# Patient Record
Sex: MALE | Race: WHITE | URBAN - METROPOLITAN AREA
[De-identification: names, ages, dates, MRNs, and addresses within clinical notes are randomized per-mention and may not be internally consistent; named-entity substitution may affect disease eponyms.]

---

## 2021-09-29 ENCOUNTER — OFFICE VISIT (OUTPATIENT)
Dept: PULMONOLOGY | Age: 51
End: 2021-09-29
Payer: COMMERCIAL

## 2021-09-29 VITALS
DIASTOLIC BLOOD PRESSURE: 76 MMHG | SYSTOLIC BLOOD PRESSURE: 126 MMHG | WEIGHT: 284.2 LBS | RESPIRATION RATE: 18 BRPM | BODY MASS INDEX: 39.79 KG/M2 | HEIGHT: 71 IN | TEMPERATURE: 97.6 F | OXYGEN SATURATION: 96 % | HEART RATE: 68 BPM

## 2021-09-29 DIAGNOSIS — G47.33 OSA (OBSTRUCTIVE SLEEP APNEA): ICD-10-CM

## 2021-09-29 DIAGNOSIS — J30.89 NON-SEASONAL ALLERGIC RHINITIS DUE TO OTHER ALLERGIC TRIGGER: Primary | ICD-10-CM

## 2021-09-29 DIAGNOSIS — R06.83 SNORING: ICD-10-CM

## 2021-09-29 PROCEDURE — 99204 OFFICE O/P NEW MOD 45 MIN: CPT | Performed by: INTERNAL MEDICINE

## 2021-09-29 RX ORDER — METOPROLOL TARTRATE 50 MG/1
50 TABLET ORAL 2 TIMES DAILY
COMMUNITY
Start: 2021-08-04

## 2021-09-29 RX ORDER — IPRATROPIUM BROMIDE 42 UG/1
2 SPRAY, METERED NASAL 3 TIMES DAILY
Qty: 15 ML | Refills: 1 | Status: SHIPPED | OUTPATIENT
Start: 2021-09-29

## 2021-09-29 RX ORDER — ROSUVASTATIN CALCIUM 10 MG/1
TABLET, COATED ORAL
COMMUNITY
Start: 2021-08-04

## 2021-09-29 NOTE — LETTER
9/29/2021    Patient: Michelle Jacques   YOB: 1970   Date of Visit: 9/29/2021     Adriana Woods MD  [address] [place] [id number]  Via Fax: [phone]    Dear Adriana Woods MD,      Thank you for referring Mr. Michelle Jacques to [address] [place] [address] for evaluation. My notes for this consultation are attached. If you have questions, please do not hesitate to call me. I look forward to following your patient along with you.       Sincerely,    La Brock MD

## 2021-09-29 NOTE — PROGRESS NOTES
Christian Ng presents today for   Chief Complaint   Patient presents with    Sleep Problem     Snoring       Is someone accompanying this pt? No    Is the patient using any DME equipment during OV? No    -DME Company NA    Depression Screening:  3 most recent PHQ Screens 9/29/2021   Little interest or pleasure in doing things Not at all   Feeling down, depressed, irritable, or hopeless Not at all   Total Score PHQ 2 0       Learning Assessment:  Learning Assessment 9/29/2021   PRIMARY LEARNER Patient   PRIMARY LANGUAGE ENGLISH   LEARNER PREFERENCE PRIMARY READING     DEMONSTRATION   RELATIONSHIP SELF       Abuse Screening:  No flowsheet data found. Fall Risk  No flowsheet data found. Coordination of Care:  1. Have you been to the ER, urgent care clinic since your last visit? Hospitalized since your last visit? No    2. Have you seen or consulted any other health care providers outside of the [address] [place] [address] since your last visit? Include any pap smears or colon screening.  No

## 2021-09-29 NOTE — PROGRESS NOTES
[facility]  Pulmonary, Critical Care, and Sleep Medicine      Initial pulmonary Office clinic visit    Name: Alba Saavedra     : 1970     Date: 2021        Subjective:   Patient has been referred for evaluation of: Cough and snoring    Patient is a 46 y.o. male is seeking evaluation for persistent symptoms of cough which lasted about 3 months and now have improved. He had a dry to occasionally productive cough for several months which now has improved. He has been diagnosed with allergic rhinitis and was prescribed Flonase along with another nasal spray but had to stop using it due to significant burning in his nostrils. He describes difficulty breathing through his nose with almost complete blockage of the right nostril. Denies any significant postnasal drainage that he is aware of. States that his whole right side of the face is always sore and uncomfortable  Does complain of occasional wheezing  Complains of waking up at night not being able to breathe. Has daytime fatigue  Denies persistent headaches  No complaints of chest pain, fever, chills, night sweats dyspepsia, reflux. Patient is a never smoker  Occupational exposure-retired . 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 History reviewed. No pertinent past medical history. History reviewed. No pertinent surgical history.     Social History     Socioeconomic History    Marital status: SINGLE     Spouse name: Not on file    Number of children: Not on file    Years of education: Not on file    Highest education level: Not on file   Tobacco Use    Smoking status: Never Smoker    Smokeless tobacco: Never Used     Social Determinants of Health     Financial Resource Strain:     Difficulty of Paying Living Expenses:    Food Insecurity:     Worried About Running Out of Food in the Last Year:     [address] in the Last Year:    Transportation Needs:     Lack of Transportation (Medical):  Lack of Transportation (Non-Medical):    Physical Activity:     Days of Exercise per Week:     Minutes of Exercise per Session:    Stress:     Feeling of Stress :    Social Connections:     Frequency of Communication with Friends and Family:     Frequency of Social Gatherings with Friends and Family:     Attends Jehov[employer] witness Services:     Active Member of Clubs or Organizations:     Attends Club or Organization Meetings:     Marital Status:        Family History   Problem Relation Age of Onset    Asthma Mother     Hypertension Mother     Hypertension Father        No Known Allergies    Current Outpatient Medications   Medication Sig Dispense Refill    metoprolol tartrate (LOPRESSOR) 50 mg tablet Take 50 mg by mouth two (2) times a day.  rosuvastatin (CRESTOR) 10 mg tablet TAKE 1 TABLET BY MOUTH ONCE DAILY AT BEDTIME      ipratropium (ATROVENT) 42 mcg (0.06 %) nasal spray 2 Sprays by Right Nostril route three (3) times daily.  15 mL 1     Review of Systems:  HEENT: No epistaxis, difficulty breathing through right nostril -blocked ,no nasal drainage, no difficulty in swallowing, no redness in eyes  Respiratory: as above  Cardiovascular: no chest pain, no palpitations, no chronic leg edema, no syncope  Gastrointestinal: no abd pain, no vomiting, no diarrhea, no bleeding symptoms  Genitourinary: No urinary symptoms or hematuria  Integument/breast: No ulcers or rashes  Musculoskeletal:Neg  Neurological: No focal weakness, no seizures, no headaches  Behvioral/Psych: No anxiety, no depression  Constitutional: No fever, no chills, no weight loss, no night sweats     Objective:     Visit Vitals  /76 (BP 1 Location: Left upper arm, BP Patient Position: Sitting, BP Cuff Size: Large adult)   Pulse 68   Temp 97.6 °F (36.4 °C) (Oral)   Resp 18   Ht 5' 11\" (1.803 m)   Wt 128.9 kg (284 lb 3.2 oz)   SpO2 96% Comment: RA Rest   BMI 39.64 kg/m²        Physical Exam:   General: comfortable, no acute distress  HEENT: pupils reactive, sclera anicteric, markedly hypertrophied nasal turbinates with complete occlusion of right nasal lumen. Tonsillar hypertrophy, Mallampati 3-4  Neck: No adenopathy or thyroid swelling, no lymphadenopathy or JVD, supple  CVS: S1S2 no murmurs  RS: Mod AE bilaterally, no tactile fremitus or egophony, no accessory muscle use  Abd: soft, non tender, no hepatosplenomegaly  Neuro: non focal, awake, alert  Extrm: no leg edema, clubbing or cyanosis  Skin: no rash    Data review:   Pertinent labs: CBC, BMP,  No results found for this or any previous visit. Imaging:  I have personally reviewed the patients radiographs and have reviewed the reports:  XR Results (most recent):  No results found for this or any previous visit. CT Results (most recent):  No results found for this or any previous visit. .   There is no problem list on file for this patient. IMPRESSION:   · Sleep apnea-significant history of snoring with nocturnal awakenings and daytime fatigue symptoms. Body habitus and nasal obstruction as well as Mallampati 3-4 additional contributors with tonsillar hypertrophy. · Persistent cough-improved with treatment. Likely upper airway cough syndrome with findings of chronic rhinitis, significant hypertrophic turbinates and right nostril obstruction. Nonseasonal allergic and nonallergic triggers likely contributor  · Obesity-BMI 39.64, recent weight loss of 15 pounds with diet changes  · History of hypertension  · Hyperlipidemia      RECOMMENDATIONS:   · Discussed with patient need for further evaluation and treatment interventions  · Will check IgE, [facility] allergy panel and consider adding Singulair  · Adding Atrovent nasal spray.  Previous intolerance to Flonase  · We will order home sleep study and consider titration study if indicated  · Will consider referral to ENT if nasal symptoms persist  · Healthy weight-patient is working on weight loss  · Preventive vaccinations  · Discussed with patient care plan and follow-up after testing completed     Health maintenance screens deferred to Primary care provider. Elodia Cage MD    This patient has a high complexity chronic care condition   This Visit needed Moderate-High complexity medically necessary decision making and management plans. Please note that this dictation was completed with [employer], the computer voice recognition software. Quite often unanticipated grammatical, syntax, homophones, and other interpretive errors are inadvertently transcribed by the computer software. Please disregard these errors. Please excuse any errors that have escaped final proofreading.

## 2021-10-26 ENCOUNTER — HOSPITAL ENCOUNTER (OUTPATIENT)
Dept: SLEEP MEDICINE | Age: 51
Discharge: HOME | End: 2021-10-26
Payer: COMMERCIAL

## 2021-10-26 DIAGNOSIS — R06.83 SNORING: ICD-10-CM

## 2021-10-26 DIAGNOSIS — G47.33 OSA (OBSTRUCTIVE SLEEP APNEA): ICD-10-CM

## 2021-10-26 PROCEDURE — 95806 SLEEP STUDY UNATT&RESP EFFT: CPT

## 2021-10-27 ENCOUNTER — HOSPITAL ENCOUNTER (OUTPATIENT)
Dept: SLEEP MEDICINE | Age: 51
Discharge: HOME | End: 2021-10-27
Payer: COMMERCIAL

## 2021-10-28 DIAGNOSIS — G47.31 COMPLEX SLEEP APNEA SYNDROME: Primary | ICD-10-CM

## 2021-10-28 DIAGNOSIS — G47.34 NOCTURNAL HYPOXIA: ICD-10-CM

## 2021-11-19 ENCOUNTER — HOSPITAL ENCOUNTER (OUTPATIENT)
Dept: SLEEP MEDICINE | Age: 51
Discharge: HOME | End: 2021-11-19
Payer: COMMERCIAL

## 2021-11-19 DIAGNOSIS — G47.31 COMPLEX SLEEP APNEA SYNDROME: ICD-10-CM

## 2021-11-19 DIAGNOSIS — G47.34 NOCTURNAL HYPOXIA: ICD-10-CM

## 2021-11-19 PROCEDURE — 95811 POLYSOM 6/>YRS CPAP 4/> PARM: CPT

## 2021-11-20 VITALS
DIASTOLIC BLOOD PRESSURE: 90 MMHG | BODY MASS INDEX: 40.88 KG/M2 | HEIGHT: 71 IN | SYSTOLIC BLOOD PRESSURE: 127 MMHG | WEIGHT: 292 LBS

## 2021-11-29 DIAGNOSIS — G47.33 OSA (OBSTRUCTIVE SLEEP APNEA): Primary | ICD-10-CM

## 2021-11-29 NOTE — PROGRESS NOTES
[address], [place], [address],  [phone]     Polysomnography Report    Poppy Tillman       Patient: Jose Patel Study Type: Titration PSG   : 1970 Patient Details: Male, 51 years, Height 5' 11\",   MR#: MV   Weight 292 lbs, BMI 40.72   Physician: Chrissy Chavez DO Ref. Physician: Tomasa Sainz MD   Recording Technician: ANNALISE Rojas, [facility], [place] Recording Details: Recorded at 8:41:03 PM on 2021    Scoring Technician:   for 590 minutes. STUDY PROTOCOL: The study consisted of 14 channels, including left and right EOG and EEG, submental and extremity EMG, EKG, airflow, respiratory effort and oximetry measurement. The study report was generated by personal review of the raw data from the patient's sleep study. TECHNICAL: A Hypopnea was scored according to AASM guidelines. A hypopnea was scored when the nasal pressure signal dropped by 30% or greater from the pre-event baseline for greater than 9 seconds and was accompanied by at least a 3% or 4% drop in the SpO2 from pre-event baseline. For Medicare and/or Medicaid patients, hypopneas were scored with an associated 4% drop in SpO2 from pre-event baseline. PROCEDURE: PAP Titration     MEDICATIONS: Lopressor, Crestor, Atrovent    SUMMARY: The blood pressure readings: pre sleep /83 post sleep /90. The sleep efficiency was 92%. Total Sleep Time: 6.8 hours. The patient spent 0.0 minutes awake after sleep onset. During the night, 13 awakenings were recorded. The arousal index was 10.3 per hour. The sleep onset latency was 6.5 minutes; the stage 2 latency was 9.5 minutes. REM sleep was 47 percent of sleep time; slow wave sleep was 27 percent. Snoring was noted initially to be intermittent and loud. Snoring improved but did not fully resolve. The overall AHI was 7.3 per hour. The RDI was 11.4 per hour.  The PLM index was 42.5, with 0 associated with arousal hourly, on average. The time with oxygen saturation below 88% was 14 minutes. The minimum oxygen saturation was 69%. The oxygen desaturation index was 7.0. The average respiratory event lasted 29.4 seconds. The longest event was 99.0 seconds. The respiratory rate was typically between 12 and 20 breaths per minute; Kush Hernandez pattern was not seen. Mouth breathing was frequently noted by recording tech. No hypnotic was reportedly taken. The patient reported insufficient sleep the previous night (<7 hours). Sleep hygiene violation:  caffeine. Sleep was interrupted by one bathroom trip. Cardiac tracing appeared to be normal sinus rhythm, without significant atrial fibrillation, heart block, bradycardia or tachycardia. Diastolic blood pressure was elevated. No alpha intrusion, parasomnias or EEG abnormalities occurred. Supplemental oxygen was not used during this study. Summary:   The patient underwent a CPAP titration study. Overall, the patient appeared to tolerate study well and reported sleeping better than typical sleep pattern. The patient did note some pressure intolerance at higher CPAP settings and their were some leak issues earlier on in this study. The patient used a large Simplus full-face mask. The patient slept propped up with 2-3 pillows under their head. Some central apneas appeared at around a CPAP setting of 16-17 cwp but then resolved. RACIEL physiology appeared well optimized between a CPAP setting of 17 to 18 cwp. Central events appeared resolved at these setting and REM rebound was also noted. Minimal snoring was noted by the end of this study. The periodic limb movement index was elevated. In some instances, this can suggest an underlying periodic limb movement disorder or other pain process such as radiculopathy. Further clinical correlated of this later finding is recommended.     <BR>PRESSURES USED DURING THE STUDY: 5, 7, 9, 11, 12, 13, 14, 15, 16, 17, 18, 19, 18/14, 20/16, 22/16 cwp. DIAGNOSIS: 1) Obstructive Sleep Apnea    RECOMMENDATIONS:     This report was generated by personal review of the raw data which was acceptable for interpretation     Start patient on auto CPAP (APAP) at 12/18 cwp; EPR/Flex:2.   Monitor for emergence of central apneas   Encourage the patient to avoid sleeping in a flat, supine position.  Clinical correlation for elevated PLM index.  Other non-invasive treatment options are recommended were applicable and include the following: weight reduction, smoking cessation, body position training, and modification of alcohol ingestion and/or sedating agents.  If these treatments are not possible or effective, an oral appliance may be an alternative and/or adjuvant non-invasive treatment.  If non-invasive treatments are not possible or effective, consideration may be given to possible corrective surgical options.  Healthy sleep habits are encouraged.  Individuals are encouraged to obtain 7-9 hours of sleep per day.   safety is encouraged. Drowsy and/or inattentive driving should be avoided.  COVID-19 precautions as recommended by the Centers for Disease Control (CDC)     Follow up with provider as directed.     Marlin Aguilera DO Electronically signed at 3:04:31 PM, November 29, 2021

## 2021-12-03 ENCOUNTER — TELEPHONE (OUTPATIENT)
Dept: PULMONOLOGY | Age: 51
End: 2021-12-03

## 2021-12-03 NOTE — TELEPHONE ENCOUNTER
Pt's mother SALENA([phone]). Pt has not gotten the results of sleep study done 11/19/21. Please have Dr Raj Timmons or nurse call him with the results as soon as possible.

## 2021-12-06 ENCOUNTER — TELEPHONE (OUTPATIENT)
Dept: PULMONOLOGY | Age: 51
End: 2021-12-06

## 2021-12-06 NOTE — TELEPHONE ENCOUNTER
Pt's mother MAGGIE([phone]). Pt needs to talk to nurse or Dr Yovanny Alexander regarding the results of his sleep study. It is important that he gets a call back as soon as possible. Pt has already called with no response.

## 2021-12-08 NOTE — TELEPHONE ENCOUNTER
APAP 12/18 order faxed to [address] [place] [address], [phone]. Patient contacted and study results reviewed with him. DME info provided to patient and he is encouraged to contact our office with any additional questions or concerns he may have. Patient should hear from [address] [place] [address]  within 2 weeks. If not, I have requested they call the office to let me know.

## 2021-12-13 NOTE — TELEPHONE ENCOUNTER
APAP 12/18 order faxed to [address] [place] [address], [phone]. Patient contacted and study results reviewed with him. DME info provided to patient and he is encouraged to contact our office with any additional questions or concerns he may have. Patient should hear from [address] [place] [address]  within 2 weeks. If not, I have requested they call the office to let me know.

## 2021-12-13 NOTE — PROGRESS NOTES
APAP 12/18 order faxed to [address] [place] [address], [phone]. Patient contacted and study results reviewed with him. DME info provided to patient and he is encouraged to contact our office with any additional questions or concerns he may have. Patient should hear from [address] [place] [address]  within 2 weeks. If not, I have requested they call the office to let me know.

## 2021-12-17 ENCOUNTER — TELEPHONE (OUTPATIENT)
Dept: PULMONOLOGY | Age: 51
End: 2021-12-17

## 2021-12-17 NOTE — TELEPHONE ENCOUNTER
Pt's wife calling back asking to speak with nurse Nasir Taveras. She is extremely upset and stated that our office was supposed to call her back today 12/17/2021. Pt needs to speak with nurse re his cpap machine. She stated that they are leaving next week to go out of town for [place] and he still has not received his machine. Can someone please call and speak with either the patient or the wife? ??? [phone]

## 2021-12-17 NOTE — TELEPHONE ENCOUNTER
Pt's wife called stating that she needed to speak with Tomás Chavez re his cpap equipment.  Please advise [phone]

## 2022-08-03 ENCOUNTER — DOCUMENTATION ONLY (OUTPATIENT)
Dept: PULMONOLOGY | Age: 52
End: 2022-08-03

## 2022-08-03 PROBLEM — E78.2 MIXED HYPERLIPIDEMIA: Status: ACTIVE | Noted: 2020-02-06

## 2022-08-03 PROBLEM — J30.9 ALLERGIC RHINITIS: Status: ACTIVE | Noted: 2020-02-06

## 2022-08-03 PROBLEM — R73.9 HYPERGLYCEMIA: Status: ACTIVE | Noted: 2020-02-06

## 2022-08-03 PROBLEM — I10 ESSENTIAL HYPERTENSION: Status: ACTIVE | Noted: 2020-02-06
